# Patient Record
(demographics unavailable — no encounter records)

---

## 2020-03-21 NOTE — XRAY REPORT
CHEST 1 VIEW 



INDICATION / CLINICAL INFORMATION:

Shortness of breath.



COMPARISON: 

None available.



FINDINGS:



SUPPORT DEVICES: None.



HEART / MEDIASTINUM: No significant abnormality. 



LUNGS / PLEURA: No significant pulmonary or pleural abnormality. No pneumothorax. 



IMPRESSION: 

No acute finding.



Signer Name: Rashad Murillo MD 

Signed: 3/21/2020 2:26 PM

Workstation Name: [id number]

## 2020-03-21 NOTE — EMERGENCY DEPARTMENT REPORT
ED General Adult HPI





- General


Chief complaint: Upper Respiratory Infection


Stated complaint: COUGH,BODYACHE


Time Seen by Provider: 03/21/20 14:17


Source: patient, EMS


Mode of arrival: Stretcher


Limitations: No Limitations





- History of Present Illness


Initial comments: 





Patient is a 53-year-old female past medical history of bronchitis who is been 

having cough and body ache for 4 days patient has a history of chronic 

bronchitis patient states that her body aches are a 7 out of 10 are an achy type

of pain nothing makes them better and nothing makes them worse.


Severity scale (0 -10): 0





- Related Data


                                  Previous Rx's











 Medication  Instructions  Recorded  Last Taken  Type


 


Albuterol INH(or & Nicu Only) 2 puff IH QID PRN #8.5 gram 03/21/20 Unknown Rx





[ProAir HFA Inhaler]    


 


Azithromycin [Zithromax TAB] 500 mg PO QDAY #6 tablet 03/21/20 Unknown Rx











                                    Allergies











Allergy/AdvReac Type Severity Reaction Status Date / Time


 


steroids Allergy  Unknown Uncoded 03/21/20 13:30














ED Review of Systems


ROS: 


Stated complaint: COUGH,BODYACHE


Other details as noted in HPI





Constitutional: denies: chills, fever


Eyes: denies: eye pain, eye discharge, vision change


ENT: denies: ear pain, throat pain


Respiratory: denies: cough, shortness of breath, wheezing


Cardiovascular: denies: chest pain, palpitations


Endocrine: no symptoms reported


Gastrointestinal: denies: abdominal pain, nausea, diarrhea


Genitourinary: denies: urgency, dysuria, discharge


Musculoskeletal: myalgia.  denies: back pain, joint swelling, arthralgia


Skin: denies: rash, lesions


Neurological: denies: headache, weakness, paresthesias


Psychiatric: denies: anxiety, depression


Hematological/Lymphatic: denies: easy bleeding, easy bruising





ED Past Medical Hx





- Past Medical History


Additional medical history: bronchitis





- Social History


Smoking Status: Current Every Day Smoker





- Medications


Home Medications: 


                                Home Medications











 Medication  Instructions  Recorded  Confirmed  Last Taken  Type


 


Albuterol INH(or & Nicu Only) 2 puff IH QID PRN #8.5 gram 03/21/20  Unknown Rx





[ProAir HFA Inhaler]     


 


Azithromycin [Zithromax TAB] 500 mg PO QDAY #6 tablet 03/21/20  Unknown Rx














ED Physical Exam





- General


Limitations: No Limitations


General appearance: alert, in no apparent distress





- Head


Head exam: Present: atraumatic, normocephalic





- Eye


Eye exam: Present: normal appearance





- ENT


ENT exam: Present: mucous membranes moist





- Neck


Neck exam: Present: normal inspection





- Respiratory


Respiratory exam: Present: wheezes.  Absent: respiratory distress





- Cardiovascular


Cardiovascular Exam: Present: regular rate, normal rhythm.  Absent: systolic 

murmur, diastolic murmur, rubs, gallop





- GI/Abdominal


GI/Abdominal exam: Present: soft, normal bowel sounds





- Extremities Exam


Extremities exam: Present: normal inspection





- Back Exam


Back exam: Present: normal inspection





- Neurological Exam


Neurological exam: Present: alert, oriented X3





- Psychiatric


Psychiatric exam: Present: normal affect, normal mood





- Skin


Skin exam: Present: warm, dry, intact, normal color.  Absent: rash





ED Course


                                   Vital Signs











  03/21/20 03/21/20 03/21/20





  13:33 13:38 13:46


 


Temperature 97.3 F L  


 


Pulse Rate 70  58 L


 


Respiratory 18  24





Rate   


 


Blood Pressure   129/74


 


Blood Pressure 138/88  





[Left]   


 


O2 Sat by Pulse 98 93 94





Oximetry   














  03/21/20 03/21/20 03/21/20





  13:48 16:00 17:18


 


Temperature 98.7 F  


 


Pulse Rate 67 62 63


 


Respiratory 22 23 20





Rate   


 


Blood Pressure  136/82 135/85


 


Blood Pressure 129/74  





[Left]   


 


O2 Sat by Pulse 94 91 91





Oximetry   














  03/21/20





  18:14


 


Temperature 


 


Pulse Rate 62


 


Respiratory 20





Rate 


 


Blood Pressure 


 


Blood Pressure 136/71





[Left] 


 


O2 Sat by Pulse 93





Oximetry 














ED Medical Decision Making





- Lab Data


Result diagrams: 


                                 03/21/20 14:34





                                 03/21/20 14:34








                                   Lab Results











  03/21/20 03/21/20 Range/Units





  14:34 14:34 


 


WBC  6.5   (4.5-11.0)  K/mm3


 


RBC  4.50   (3.65-5.03)  M/mm3


 


Hgb  15.7 H   (10.1-14.3)  gm/dl


 


Hct  43.0 H   (30.3-42.9)  %


 


MCV  96   (79-97)  fl


 


MCH  35 H   (28-32)  pg


 


MCHC  36 H   (30-34)  %


 


RDW  13.7   (13.2-15.2)  %


 


Plt Count  187   (140-440)  K/mm3


 


Sodium   137  (137-145)  mmol/L


 


Potassium   4.2  (3.6-5.0)  mmol/L


 


Chloride   100.1  ()  mmol/L


 


Carbon Dioxide   22  (22-30)  mmol/L


 


Anion Gap   19  mmol/L


 


BUN   15  (7-17)  mg/dL


 


Creatinine   0.7  (0.7-1.2)  mg/dL


 


Estimated GFR   > 60  ml/min


 


BUN/Creatinine Ratio   21  %


 


Glucose   82  ()  mg/dL


 


Calcium   8.6  (8.4-10.2)  mg/dL














- Radiology Data


Radiology results: report reviewed, image reviewed





Chest x-ray: Shows no acute cardiopulmonary disease





- Medical Decision Making


Cdx: PNA


Ddx: Bronchits, influenza





I will get xray, blood work, breathing treatment and xrays and will send patient

 home. 











Critical care attestation.: 


If time is entered above; I have spent that time in minutes in the direct care 

of this critically ill patient, excluding procedure time.








ED Disposition


Clinical Impression: 


 Influenza, Cough, SOB (shortness of breath)





Disposition: DC-01 TO HOME OR SELFCARE


Is pt being admited?: No


Does the pt Need Aspirin: No


Condition: Stable


Instructions:  Influenza (ED)


Prescriptions: 


Albuterol INH(or & Nicu Only) [ProAir HFA Inhaler] 2 puff IH QID PRN #8.5 gram


 PRN Reason: Shortness Of Breath


Azithromycin [Zithromax TAB] 500 mg PO QDAY #6 tablet


Referrals: 


[facility], [place] [Primary Care Provider] - 3-5 Days